# Patient Record
Sex: MALE | Race: ASIAN | NOT HISPANIC OR LATINO
[De-identification: names, ages, dates, MRNs, and addresses within clinical notes are randomized per-mention and may not be internally consistent; named-entity substitution may affect disease eponyms.]

---

## 2017-04-12 ENCOUNTER — APPOINTMENT (OUTPATIENT)
Dept: UROLOGY | Facility: CLINIC | Age: 77
End: 2017-04-12

## 2017-04-12 ENCOUNTER — OUTPATIENT (OUTPATIENT)
Dept: OUTPATIENT SERVICES | Facility: HOSPITAL | Age: 77
LOS: 1 days | End: 2017-04-12
Payer: COMMERCIAL

## 2017-04-12 VITALS
HEIGHT: 62 IN | RESPIRATION RATE: 16 BRPM | HEART RATE: 89 BPM | BODY MASS INDEX: 23.55 KG/M2 | TEMPERATURE: 98.2 F | DIASTOLIC BLOOD PRESSURE: 77 MMHG | WEIGHT: 128 LBS | SYSTOLIC BLOOD PRESSURE: 145 MMHG

## 2017-04-12 PROBLEM — Z00.00 ENCOUNTER FOR PREVENTIVE HEALTH EXAMINATION: Status: ACTIVE | Noted: 2017-04-12

## 2017-04-12 PROCEDURE — 51702 INSERT TEMP BLADDER CATH: CPT

## 2017-04-13 LAB
APPEARANCE: CLEAR
BACTERIA: NEGATIVE
BILIRUBIN URINE: NEGATIVE
BLOOD URINE: NEGATIVE
COLOR: YELLOW
GLUCOSE QUALITATIVE U: NORMAL MG/DL
KETONES URINE: NEGATIVE
LEUKOCYTE ESTERASE URINE: NEGATIVE
MICROSCOPIC-UA: NORMAL
NITRITE URINE: NEGATIVE
PH URINE: 6
PROTEIN URINE: NEGATIVE MG/DL
RED BLOOD CELLS URINE: 3 /HPF
SPECIFIC GRAVITY URINE: 1.02
SQUAMOUS EPITHELIAL CELLS: 0 /HPF
UROBILINOGEN URINE: NORMAL MG/DL
WHITE BLOOD CELLS URINE: 0 /HPF

## 2017-04-19 ENCOUNTER — APPOINTMENT (OUTPATIENT)
Dept: UROLOGY | Facility: CLINIC | Age: 77
End: 2017-04-19

## 2017-04-25 DIAGNOSIS — R33.8 OTHER RETENTION OF URINE: ICD-10-CM

## 2017-04-25 DIAGNOSIS — N40.1 BENIGN PROSTATIC HYPERPLASIA WITH LOWER URINARY TRACT SYMPTOMS: ICD-10-CM

## 2017-05-17 ENCOUNTER — APPOINTMENT (OUTPATIENT)
Dept: UROLOGY | Facility: CLINIC | Age: 77
End: 2017-05-17

## 2017-11-08 ENCOUNTER — APPOINTMENT (OUTPATIENT)
Dept: UROLOGY | Facility: CLINIC | Age: 77
End: 2017-11-08
Payer: MEDICARE

## 2017-11-08 PROCEDURE — 99213 OFFICE O/P EST LOW 20 MIN: CPT

## 2018-02-05 ENCOUNTER — MEDICATION RENEWAL (OUTPATIENT)
Age: 78
End: 2018-02-05

## 2018-11-09 ENCOUNTER — APPOINTMENT (OUTPATIENT)
Dept: UROLOGY | Facility: CLINIC | Age: 78
End: 2018-11-09
Payer: MEDICARE

## 2018-11-09 VITALS
TEMPERATURE: 98.5 F | DIASTOLIC BLOOD PRESSURE: 80 MMHG | SYSTOLIC BLOOD PRESSURE: 172 MMHG | RESPIRATION RATE: 17 BRPM | HEART RATE: 74 BPM

## 2018-11-09 PROCEDURE — 99213 OFFICE O/P EST LOW 20 MIN: CPT

## 2019-09-09 ENCOUNTER — APPOINTMENT (OUTPATIENT)
Dept: UROLOGY | Facility: CLINIC | Age: 79
End: 2019-09-09
Payer: MEDICARE

## 2019-09-09 PROCEDURE — 99213 OFFICE O/P EST LOW 20 MIN: CPT

## 2019-09-09 NOTE — REVIEW OF SYSTEMS
[see HPI] : see HPI [Loss of interest] : loss of interest in sexual activity [Poor quality erections] : Poor quality erections [Urine Infection (bladder/kidney)] : bladder/kidney infection [Pain during urination] : pain during urination [Wait a long time to urinate] : waits a long time to urinate [Slow urine stream] : slow urine stream [Negative] : Heme/Lymph

## 2019-09-09 NOTE — PHYSICAL EXAM
[General Appearance - Well Developed] : well developed [General Appearance - In No Acute Distress] : no acute distress [General Appearance - Well Nourished] : well nourished [Abdomen Soft] : soft [Abdomen Tenderness] : non-tender [Costovertebral Angle Tenderness] : no ~M costovertebral angle tenderness [Skin Color & Pigmentation] : normal skin color and pigmentation [Exaggerated Use Of Accessory Muscles For Inspiration] : no accessory muscle use [Edema] : no peripheral edema [No Focal Deficits] : no focal deficits [Oriented To Time, Place, And Person] : oriented to person, place, and time [Normal Station and Gait] : the gait and station were normal for the patient's age [FreeTextEntry1] : PVR=36cc [Cervical Lymph Nodes Enlarged Anterior Bilaterally] : anterior cervical [Supraclavicular Lymph Nodes Enlarged Bilaterally] : supraclavicular

## 2019-09-09 NOTE — ASSESSMENT
[FreeTextEntry1] : Baseline BPH with acute exacerbation of unclear etiology with acute retention now voiding on his own but obstructive pattern on uroflow. Doing well and emptying well. \par --Cont flomax and finasteride\par --RTC in 1y\par \par \par \par

## 2019-09-09 NOTE — HISTORY OF PRESENT ILLNESS
[FreeTextEntry1] : 80yo gentleman with cc of urinary retention, BPH. Pt seen in 2017 for urinary retention. He had taken OTC for allergies. None of these meds contained phenylephrine and review shows unlikely to be source of retention. No fevers or chills. He had yun placed that drained 800cc of urine. He was started on flomax and had yun removed and passed a VT. \par \par At baseline, pt with only mild urinary sx. He has nocturia 1-2 times per night, can hold 5-6h so basically can go most of the night. He does have some baseline hesitancy. Some daytime frequency as well. US in [place] showed 48cc prostate last year and this year measured as 52cc. No family hx of prostate ca. PSA was 2.3 in Sept 2016 with PCP. Uroflow previously showed prolonged flat curve with void time of 41 seconds. Qmax only 6 with Qavg of 4. His only medications are BPH meds. \par \par He returns today for follow-up. No issues with retention or difficulty voiding. Occasional strain. He has occasional feelings of incomplete emptying (more in the morning). No urgency. Occasional hesitancy and double voiding.

## 2019-11-21 ENCOUNTER — MEDICATION RENEWAL (OUTPATIENT)
Age: 79
End: 2019-11-21

## 2020-09-14 ENCOUNTER — APPOINTMENT (OUTPATIENT)
Dept: UROLOGY | Facility: CLINIC | Age: 80
End: 2020-09-14
Payer: MEDICARE

## 2020-09-14 VITALS
HEIGHT: 62 IN | RESPIRATION RATE: 16 BRPM | BODY MASS INDEX: 23.55 KG/M2 | WEIGHT: 128 LBS | HEART RATE: 65 BPM | DIASTOLIC BLOOD PRESSURE: 87 MMHG | SYSTOLIC BLOOD PRESSURE: 182 MMHG

## 2020-09-14 VITALS — TEMPERATURE: 97.2 F

## 2020-09-14 PROCEDURE — 99213 OFFICE O/P EST LOW 20 MIN: CPT

## 2020-09-14 NOTE — HISTORY OF PRESENT ILLNESS
[FreeTextEntry1] : 78yo gentleman with cc of urinary retention, BPH. Pt seen in 2017 for urinary retention. He had taken OTC for allergies. None of these meds contained phenylephrine and review shows unlikely to be source of retention. No fevers or chills. He had yun placed that drained 800cc of urine. He was started on flomax and had yun removed and passed a VT. \par \par At baseline, pt with only mild urinary sx. He has nocturia 1-2 times per night, can hold 5-6h so basically can go most of the night. He does have some baseline hesitancy. Some daytime frequency as well. US in [place] showed 48cc prostate last year and this year measured as 52cc. No family hx of prostate ca. PSA was 2.3 in Sept 2016 with PCP. Uroflow previously showed prolonged flat curve with void time of 41 seconds. Qmax only 6 with Qavg of 4. His only medications are BPH meds. He gets up every 3h over night. \par \par He returns today for follow-up. No issues with retention or difficulty voiding. Occasional strain. He has occasional feelings of incomplete emptying (more in the morning). No urgency. Occasional hesitancy and double voiding.

## 2020-09-14 NOTE — PHYSICAL EXAM
[General Appearance - Well Developed] : well developed [General Appearance - Well Nourished] : well nourished [Abdomen Soft] : soft [General Appearance - In No Acute Distress] : no acute distress [Costovertebral Angle Tenderness] : no ~M costovertebral angle tenderness [Abdomen Tenderness] : non-tender [Edema] : no peripheral edema [Skin Color & Pigmentation] : normal skin color and pigmentation [Exaggerated Use Of Accessory Muscles For Inspiration] : no accessory muscle use [Oriented To Time, Place, And Person] : oriented to person, place, and time [No Focal Deficits] : no focal deficits [Normal Station and Gait] : the gait and station were normal for the patient's age [Supraclavicular Lymph Nodes Enlarged Bilaterally] : supraclavicular [Cervical Lymph Nodes Enlarged Anterior Bilaterally] : anterior cervical [FreeTextEntry1] : PVR=19cc

## 2021-09-08 ENCOUNTER — APPOINTMENT (OUTPATIENT)
Dept: UROLOGY | Facility: CLINIC | Age: 81
End: 2021-09-08
Payer: MEDICARE

## 2021-09-08 VITALS — DIASTOLIC BLOOD PRESSURE: 91 MMHG | HEART RATE: 71 BPM | SYSTOLIC BLOOD PRESSURE: 171 MMHG

## 2021-09-08 PROCEDURE — 99212 OFFICE O/P EST SF 10 MIN: CPT

## 2021-09-08 NOTE — HISTORY OF PRESENT ILLNESS
[FreeTextEntry1] : 82yo gentleman with cc of urinary retention, BPH. Pt seen in 2017 for urinary retention. He had taken OTC for allergies. None of these meds contained phenylephrine and review shows unlikely to be source of retention. No fevers or chills. He had yun placed that drained 800cc of urine. He was started on flomax and had yun removed and passed a VT. \par \par At baseline, pt with only mild urinary sx. He has nocturia 1-2 times per night, can hold 5-6h so basically can go most of the night. He does have some baseline hesitancy. Some daytime frequency as well. US in [place] showed 48cc prostate last year and this year measured as 52cc. No family hx of prostate ca. PSA was 2.3 in Sept 2016 with PCP. Uroflow previously showed prolonged flat curve with void time of 41 seconds. Qmax only 6 with Qavg of 4. His only medications are BPH meds. He gets up every 3h over night. \par \par He returns today for follow-up. No issues with retention or difficulty voiding. Occasional strain. He has occasional feelings of incomplete emptying (more in the morning). No urgency. Occasional hesitancy and double voiding. Has not traveled in the last 2y because of covid.

## 2021-09-08 NOTE — ASSESSMENT
[FreeTextEntry1] : Baseline BPH with acute exacerbation of unclear etiology with acute retention now voiding on his own but obstructive pattern on uroflow. Doing well and emptying well. \par --Cont flomax and finasteride. renewed today\par --RTC in 1y\par \par \par \par

## 2021-09-08 NOTE — PHYSICAL EXAM
[General Appearance - Well Developed] : well developed [General Appearance - Well Nourished] : well nourished [General Appearance - In No Acute Distress] : no acute distress [Abdomen Soft] : soft [Abdomen Tenderness] : non-tender [Costovertebral Angle Tenderness] : no ~M costovertebral angle tenderness [Skin Color & Pigmentation] : normal skin color and pigmentation [Edema] : no peripheral edema [Exaggerated Use Of Accessory Muscles For Inspiration] : no accessory muscle use [Oriented To Time, Place, And Person] : oriented to person, place, and time [Normal Station and Gait] : the gait and station were normal for the patient's age [No Focal Deficits] : no focal deficits [FreeTextEntry1] : PVR=13cc

## 2022-09-19 ENCOUNTER — APPOINTMENT (OUTPATIENT)
Dept: UROLOGY | Facility: CLINIC | Age: 82
End: 2022-09-19

## 2022-09-19 PROCEDURE — 99213 OFFICE O/P EST LOW 20 MIN: CPT

## 2022-09-19 NOTE — HISTORY OF PRESENT ILLNESS
[FreeTextEntry1] : 83yo gentleman with cc of urinary retention, BPH. Pt seen in 2017 for urinary retention. He had taken OTC for allergies. None of these meds contained phenylephrine and review shows unlikely to be source of retention. No fevers or chills. He had yun placed that drained 800cc of urine. He was started on flomax and had yun removed and passed a VT. \par \par At baseline, pt with only mild urinary sx. He has nocturia 1-2 times per night, can hold 5-6h so basically can go most of the night. He does have some baseline hesitancy. Some daytime frequency as well. US in [place] showed 48cc prostate last year and this year measured as 52cc. No family hx of prostate ca. PSA was 2.3 in Sept 2016 with PCP. Uroflow previously showed prolonged flat curve with void time of 41 seconds. Qmax only 6 with Qavg of 4. His only medications are BPH meds. He sleeps only 5h at night. \par \par He returns today for follow-up. No issues with retention or difficulty voiding. Occasional strain. He has occasional feelings of incomplete emptying (more in the morning). No urgency. Occasional hesitancy and double voiding. Has not traveled in the last 3y because of covid. Spends most time each day working on his garden.

## 2022-09-19 NOTE — PHYSICAL EXAM
[General Appearance - Well Developed] : well developed [General Appearance - Well Nourished] : well nourished [General Appearance - In No Acute Distress] : no acute distress [Abdomen Soft] : soft [Abdomen Tenderness] : non-tender [Costovertebral Angle Tenderness] : no ~M costovertebral angle tenderness [Skin Color & Pigmentation] : normal skin color and pigmentation [Edema] : no peripheral edema [Exaggerated Use Of Accessory Muscles For Inspiration] : no accessory muscle use [Oriented To Time, Place, And Person] : oriented to person, place, and time [Normal Station and Gait] : the gait and station were normal for the patient's age [No Focal Deficits] : no focal deficits [FreeTextEntry1] : PVR=79cc

## 2022-09-19 NOTE — ASSESSMENT
[FreeTextEntry1] : Baseline BPH with acute exacerbation of unclear etiology with acute retention now voiding on his own but obstructive pattern on uroflow. Doing well and emptying well. PVR ok at 79cc\par --Cont flomax and finasteride. renewed today\par --RTC in 1y\par \par \par \par

## 2023-09-18 ENCOUNTER — APPOINTMENT (OUTPATIENT)
Dept: UROLOGY | Facility: CLINIC | Age: 83
End: 2023-09-18

## 2023-09-25 ENCOUNTER — APPOINTMENT (OUTPATIENT)
Dept: UROLOGY | Facility: CLINIC | Age: 83
End: 2023-09-25
Payer: MEDICARE

## 2023-09-25 DIAGNOSIS — N40.1 BENIGN PROSTATIC HYPERPLASIA WITH LOWER URINARY TRACT SYMPMS: ICD-10-CM

## 2023-09-25 DIAGNOSIS — R33.8 OTHER RETENTION OF URINE: ICD-10-CM

## 2023-09-25 DIAGNOSIS — N13.8 BENIGN PROSTATIC HYPERPLASIA WITH LOWER URINARY TRACT SYMPMS: ICD-10-CM

## 2023-09-25 PROCEDURE — 99213 OFFICE O/P EST LOW 20 MIN: CPT

## 2023-09-25 RX ORDER — FINASTERIDE 5 MG/1
5 TABLET, FILM COATED ORAL
Qty: 90 | Refills: 3 | Status: ACTIVE | COMMUNITY
Start: 2017-04-19 | End: 1900-01-01

## 2023-09-25 RX ORDER — TAMSULOSIN HYDROCHLORIDE 0.4 MG/1
0.4 CAPSULE ORAL
Qty: 90 | Refills: 3 | Status: ACTIVE | COMMUNITY
Start: 2017-04-12 | End: 1900-01-01

## 2024-09-06 ENCOUNTER — APPOINTMENT (OUTPATIENT)
Dept: UROLOGY | Facility: CLINIC | Age: 84
End: 2024-09-06
Payer: MEDICARE

## 2024-09-06 VITALS
HEIGHT: 62 IN | SYSTOLIC BLOOD PRESSURE: 172 MMHG | WEIGHT: 128 LBS | BODY MASS INDEX: 23.55 KG/M2 | HEART RATE: 78 BPM | OXYGEN SATURATION: 96 % | RESPIRATION RATE: 16 BRPM | DIASTOLIC BLOOD PRESSURE: 74 MMHG

## 2024-09-06 DIAGNOSIS — N40.0 BENIGN PROSTATIC HYPERPLASIA WITHOUT LOWER URINARY TRACT SYMPMS: ICD-10-CM

## 2024-09-06 DIAGNOSIS — N40.1 BENIGN PROSTATIC HYPERPLASIA WITH LOWER URINARY TRACT SYMPMS: ICD-10-CM

## 2024-09-06 PROCEDURE — G2211 COMPLEX E/M VISIT ADD ON: CPT | Mod: NC

## 2024-09-06 PROCEDURE — 51798 US URINE CAPACITY MEASURE: CPT

## 2024-09-06 PROCEDURE — 99213 OFFICE O/P EST LOW 20 MIN: CPT | Mod: 25

## 2024-09-06 NOTE — HISTORY OF PRESENT ILLNESS
[FreeTextEntry1] : 84 year old male with PMHx of BPH (on tamsulosin and finasteride), urinary retention in 2017 previously following with Dr. Goins now here to establish care. Patient reports overall minimal LUTS. He only has nocturia 2 nights out of 7 in which he wakes up once or twice. Reports he does not have to strain and his stream is unchanged from last visit. He does not feel like he retaining urine. No leakage, MAGO, urgency, hematuria, dysuria, retention or UTIs.   PSA 0.71 and UA neg in April 2024 in [place] (he shows us the paper copy, 2.3 in Sept 2016). Uroflow in 2023 showed prolonged flat curve with void time of 41 seconds. Qmax only 6 with Qavg of 4.  Sleeps 6h at night. Denies personal history of any renal/prostatic/bladder cancers. Patient is not on any other medications besides flomax and finasteride.   PVR 6cc today in office.

## 2024-09-06 NOTE — PHYSICAL EXAM
[Normal Appearance] : normal appearance [General Appearance - In No Acute Distress] : no acute distress [Heart Rate And Rhythm] : heart rate and rhythm were normal [] : no respiratory distress [Respiration, Rhythm And Depth] : normal respiratory rhythm and effort [Exaggerated Use Of Accessory Muscles For Inspiration] : no accessory muscle use [Abdomen Soft] : soft [Abdomen Tenderness] : non-tender [Skin Color & Pigmentation] : normal skin color and pigmentation [Oriented To Time, Place, And Person] : oriented to person, place, and time [Affect] : the affect was normal

## 2024-09-06 NOTE — ASSESSMENT
[FreeTextEntry1] : 84 year old male with PMHx of BPH (on tamsulosin and finasteride), urinary retention in 2017 previously following with Dr. Goins now to establish care. Patient reports overall minimal LUTS. PVR 6cc today in office. Patient reports no concerns.   -Continue Flomax and finasteride. Refilled prescription today -RTC in 1yr.   Pt will require longitudinal care for pt's chronic/complex urologic conditions.

## 2024-09-16 ENCOUNTER — APPOINTMENT (OUTPATIENT)
Dept: UROLOGY | Facility: CLINIC | Age: 84
End: 2024-09-16

## 2025-09-12 ENCOUNTER — APPOINTMENT (OUTPATIENT)
Dept: UROLOGY | Facility: CLINIC | Age: 85
End: 2025-09-12
Payer: MEDICARE

## 2025-09-12 VITALS
WEIGHT: 136 LBS | OXYGEN SATURATION: 96 % | DIASTOLIC BLOOD PRESSURE: 87 MMHG | RESPIRATION RATE: 16 BRPM | HEIGHT: 62.2 IN | HEART RATE: 67 BPM | SYSTOLIC BLOOD PRESSURE: 200 MMHG | TEMPERATURE: 98.2 F | BODY MASS INDEX: 24.71 KG/M2

## 2025-09-12 VITALS
SYSTOLIC BLOOD PRESSURE: 196 MMHG | BODY MASS INDEX: 25.03 KG/M2 | WEIGHT: 136 LBS | HEIGHT: 62 IN | DIASTOLIC BLOOD PRESSURE: 88 MMHG

## 2025-09-12 DIAGNOSIS — N40.1 BENIGN PROSTATIC HYPERPLASIA WITH LOWER URINARY TRACT SYMPMS: ICD-10-CM

## 2025-09-12 PROCEDURE — 51798 US URINE CAPACITY MEASURE: CPT

## 2025-09-12 PROCEDURE — 99213 OFFICE O/P EST LOW 20 MIN: CPT | Mod: 25
